# Patient Record
Sex: MALE | Race: WHITE | NOT HISPANIC OR LATINO
[De-identification: names, ages, dates, MRNs, and addresses within clinical notes are randomized per-mention and may not be internally consistent; named-entity substitution may affect disease eponyms.]

---

## 2024-02-13 ENCOUNTER — APPOINTMENT (OUTPATIENT)
Dept: UROLOGY | Facility: CLINIC | Age: 44
End: 2024-02-13

## 2024-02-13 PROBLEM — Z00.00 ENCOUNTER FOR PREVENTIVE HEALTH EXAMINATION: Status: ACTIVE | Noted: 2024-02-13

## 2024-02-14 ENCOUNTER — APPOINTMENT (OUTPATIENT)
Dept: UROLOGY | Facility: CLINIC | Age: 44
End: 2024-02-14
Payer: COMMERCIAL

## 2024-02-14 VITALS
HEIGHT: 76 IN | DIASTOLIC BLOOD PRESSURE: 94 MMHG | HEART RATE: 79 BPM | BODY MASS INDEX: 35.56 KG/M2 | SYSTOLIC BLOOD PRESSURE: 152 MMHG | WEIGHT: 292 LBS | OXYGEN SATURATION: 98 % | TEMPERATURE: 96.9 F

## 2024-02-14 DIAGNOSIS — Z80.0 FAMILY HISTORY OF MALIGNANT NEOPLASM OF DIGESTIVE ORGANS: ICD-10-CM

## 2024-02-14 DIAGNOSIS — Z78.9 OTHER SPECIFIED HEALTH STATUS: ICD-10-CM

## 2024-02-14 DIAGNOSIS — Z80.42 FAMILY HISTORY OF MALIGNANT NEOPLASM OF PROSTATE: ICD-10-CM

## 2024-02-14 DIAGNOSIS — Z83.3 FAMILY HISTORY OF DIABETES MELLITUS: ICD-10-CM

## 2024-02-14 PROCEDURE — 99204 OFFICE O/P NEW MOD 45 MIN: CPT

## 2024-02-14 NOTE — ASSESSMENT
[FreeTextEntry1] : Very pleasant 43-year-old gentleman who presents for evaluation of left renal lesion -Ultrasound images from [facility] reviewed demonstrating a 2.4 x 2.8 cm hypoechoic nodule in the left kidney -We discussed potential etiologies of a hypoechoic nodule, including solid masses and renal cysts -We discussed Bosniak cyst classification system in detail -MRI renal mass protocol -Follow-up after MRI

## 2024-02-14 NOTE — HISTORY OF PRESENT ILLNESS
[FreeTextEntry1] : Very pleasant 43-year-old gentleman presents for evaluation of left renal lesion.  He recently underwent a renal ultrasound at [facility] which demonstrated a 2.4 x 2.8 cm hypoechoic nodule in the midportion of the left kidney.  He reports no family history of  malignancy coming including renal cell carcinoma.  He denies flank pain.  No hematuria.  No dysuria.  No other complaints.

## 2024-03-05 ENCOUNTER — APPOINTMENT (OUTPATIENT)
Dept: UROLOGY | Facility: CLINIC | Age: 44
End: 2024-03-05
Payer: COMMERCIAL

## 2024-03-05 DIAGNOSIS — N28.89 OTHER SPECIFIED DISORDERS OF KIDNEY AND URETER: ICD-10-CM

## 2024-03-05 PROCEDURE — 99214 OFFICE O/P EST MOD 30 MIN: CPT

## 2024-03-05 NOTE — ASSESSMENT
[FreeTextEntry1] : Very pleasant 43-year-old gentleman who presents for follow-up of indeterminate left renal lesion -MRI images reviewed demonstrating hepatosplenomegaly, but no kidney stones, renal masses, or hydronephrosis -We discussed the superior diagnostic accuracy of the MRI -Patient will follow-up with his primary care physician regarding hepatosplenomegaly -Follow-up as needed  Over 30 minutes were spent trying to attempt to connect with the patient via [employer], microscopic teams, and telephone encounter

## 2024-03-05 NOTE — HISTORY OF PRESENT ILLNESS
[Home] : at home, [unfilled] , at the time of the visit. [Medical Office: ([place] [facility])___] : at the medical office located in  [FreeTextEntry1] : The patient-doctor relationship has been established via real time audio communication using telemedicine software.  He has requested care to be assessed and treated through telemedicine. He understands that there may be limitations in this process and that he may need further follow up care in the office and/or hospital setting. I attempted to connect with the patient multiple times via telehealth video platforms, including [facility] Teams and [employer], however he was unable to establish a video connection. He requested that care be provided via audio only.  Very pleasant 43-year-old gentleman who presents for follow-up of indeterminate left renal lesion.  He previously underwent a renal ultrasound which demonstrated concern for left renal lesion.  Because of this he underwent a renal mass protocol MRI which demonstrated no abnormalities in the kidneys.  He did demonstrate significant hepatosplenomegaly, however.  He denies abdominal pain.  No other complaints.

## 2024-03-13 ENCOUNTER — APPOINTMENT (OUTPATIENT)
Dept: PULMONOLOGY | Facility: CLINIC | Age: 44
End: 2024-03-13

## 2024-05-01 ENCOUNTER — APPOINTMENT (OUTPATIENT)
Dept: PULMONOLOGY | Facility: CLINIC | Age: 44
End: 2024-05-01
Payer: COMMERCIAL

## 2024-05-01 VITALS
SYSTOLIC BLOOD PRESSURE: 170 MMHG | OXYGEN SATURATION: 98 % | HEART RATE: 78 BPM | HEIGHT: 76 IN | WEIGHT: 298 LBS | TEMPERATURE: 97.7 F | DIASTOLIC BLOOD PRESSURE: 102 MMHG | BODY MASS INDEX: 36.29 KG/M2

## 2024-05-01 VITALS — SYSTOLIC BLOOD PRESSURE: 156 MMHG | DIASTOLIC BLOOD PRESSURE: 94 MMHG

## 2024-05-01 PROCEDURE — 99214 OFFICE O/P EST MOD 30 MIN: CPT

## 2024-05-02 NOTE — ASSESSMENT
[FreeTextEntry1] : Patient to return for lung function testing. Obtain chest radiograph report. Consider short course of corticosteroid therapy if cough persist. Amaury cali. pending above.

## 2024-05-02 NOTE — HISTORY OF PRESENT ILLNESS
[Never] : never [TextBox_4] : DEE BEYER is a 43 year old  M referred for pulmonary evaluation for SOB.  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 Has gained wt.  Saw cardiology had workup. Normal. Referred pulmonary. In interim had CXR and sono of organs. Told spot on colon and sent for MRI told normal.  Referred for SOB. Did recently renovate warehouse. [place] 3-4 flights. No exercise. In 2/24 was ill. Gave meds by MD.  Went to [facility] and ill again. Rested and improved. Still some cough.  Now minimal. Dry cough. No GERD. No wheezing. Positive seasonal allergies.   Past pulmonary history. N Occupational Exposure. Above. Family history of pulmonary disease. COPD smoker. Recent travel  [place], [place], [place] [employer] not allergic.

## 2024-05-02 NOTE — CONSULT LETTER
[Dear  ___] : Dear ~JAMES, [Consult Letter:] : I had the pleasure of evaluating your patient, [unfilled]. [Consult Closing:] : Thank you very much for allowing me to participate in the care of this patient.  If you have any questions, please do not hesitate to contact me. [Sincerely,] : Sincerely, [FreeTextEntry2] : Daniel Castanon MD [FreeTextEntry3] : Thomas Edwards MD [facility]

## 2024-05-02 NOTE — DISCUSSION/SUMMARY
[FreeTextEntry1] : Dyspnea on exertion without clinical evidence of significant underlying pulmonary pathology. Patient will obtain recent chest radiograph and return for lung function testing to assess further. Most likely exertional dyspnea is related to weight and conditioning. Cough appears a postinfectious.  Now relatively minimal.  Will observe.

## 2024-05-02 NOTE — PHYSICAL EXAM
[No Acute Distress] : no acute distress [Normal Oropharynx] : normal oropharynx [Normal Appearance] : normal appearance [No Neck Mass] : no neck mass [Normal Rate/Rhythm] : normal rate/rhythm [Normal S1, S2] : normal s1, s2 [No Murmurs] : no murmurs [No Resp Distress] : no resp distress [Clear to Auscultation Bilaterally] : clear to auscultation bilaterally [No Abnormalities] : no abnormalities [Benign] : benign [Normal Gait] : normal gait [No Clubbing] : no clubbing [No Cyanosis] : no cyanosis [No Edema] : no edema [Normal Color/ Pigmentation] : normal color/ pigmentation [No Focal Deficits] : no focal deficits [Oriented x3] : oriented x3 [Normal Affect] : normal affect [Normal to Percussion] : normal to percussion

## 2024-05-03 ENCOUNTER — APPOINTMENT (OUTPATIENT)
Dept: PULMONOLOGY | Facility: CLINIC | Age: 44
End: 2024-05-03
Payer: COMMERCIAL

## 2024-05-03 VITALS
DIASTOLIC BLOOD PRESSURE: 94 MMHG | RESPIRATION RATE: 15 BRPM | HEIGHT: 76 IN | HEART RATE: 89 BPM | TEMPERATURE: 98 F | WEIGHT: 290 LBS | OXYGEN SATURATION: 98 % | SYSTOLIC BLOOD PRESSURE: 148 MMHG | BODY MASS INDEX: 35.31 KG/M2

## 2024-05-03 DIAGNOSIS — R06.02 SHORTNESS OF BREATH: ICD-10-CM

## 2024-05-03 DIAGNOSIS — R05.9 COUGH, UNSPECIFIED: ICD-10-CM

## 2024-05-03 LAB — HEMOGLOBIN: 12.1

## 2024-05-03 PROCEDURE — 94060 EVALUATION OF WHEEZING: CPT

## 2024-05-03 PROCEDURE — 94729 DIFFUSING CAPACITY: CPT

## 2024-05-03 PROCEDURE — 85018 HEMOGLOBIN: CPT | Mod: QW

## 2024-05-03 PROCEDURE — 94726 PLETHYSMOGRAPHY LUNG VOLUMES: CPT

## 2024-05-14 PROBLEM — R06.02 SHORTNESS OF BREATH ON EXERTION: Status: ACTIVE | Noted: 2024-05-01

## 2024-12-18 ENCOUNTER — APPOINTMENT (OUTPATIENT)
Dept: PULMONOLOGY | Facility: CLINIC | Age: 44
End: 2024-12-18
Payer: COMMERCIAL

## 2024-12-18 VITALS
BODY MASS INDEX: 37.75 KG/M2 | DIASTOLIC BLOOD PRESSURE: 116 MMHG | SYSTOLIC BLOOD PRESSURE: 169 MMHG | OXYGEN SATURATION: 97 % | HEART RATE: 89 BPM | WEIGHT: 310 LBS | HEIGHT: 76 IN | TEMPERATURE: 97.8 F

## 2024-12-18 VITALS — SYSTOLIC BLOOD PRESSURE: 160 MMHG | DIASTOLIC BLOOD PRESSURE: 94 MMHG

## 2024-12-18 DIAGNOSIS — R06.02 SHORTNESS OF BREATH: ICD-10-CM

## 2024-12-18 PROCEDURE — 99214 OFFICE O/P EST MOD 30 MIN: CPT | Mod: 25

## 2024-12-18 PROCEDURE — 95012 NITRIC OXIDE EXP GAS DETER: CPT

## 2024-12-18 PROCEDURE — 94010 BREATHING CAPACITY TEST: CPT

## 2024-12-18 RX ORDER — BUDESONIDE AND FORMOTEROL FUMARATE DIHYDRATE 160; 4.5 UG/1; UG/1
160-4.5 AEROSOL RESPIRATORY (INHALATION) TWICE DAILY
Qty: 1 | Refills: 5 | Status: ACTIVE | COMMUNITY
Start: 2024-12-18 | End: 1900-01-01

## 2025-02-05 ENCOUNTER — APPOINTMENT (OUTPATIENT)
Dept: PULMONOLOGY | Facility: CLINIC | Age: 45
End: 2025-02-05